# Patient Record
(demographics unavailable — no encounter records)

---

## 2024-11-08 NOTE — PLAN
[FreeTextEntry1] : Clinical breast exam performed and self present explained along with indications for early screening.  STI screening performed along with the cervical Pap smear.  No obvious signs of infection on examination.  Importance of maintaining yearly well reports and cervical Pap smears reviewed.  She has a primary care physician and should continue care as directed.  History of premenstrual dysphoria syndrome and was started on Zoloft.  She is overall doing well but feels that she needs to increase her medication.  Patient directed to increase her medication to 75 mg today and a new Rx was sent to pharmacy with direction.  She may return to office in 1 years time, or as needed.  All questions addressed

## 2024-11-08 NOTE — PHYSICAL EXAM
[Appropriately responsive] : appropriately responsive [Alert] : alert [No Acute Distress] : no acute distress [Soft] : soft [Non-tender] : non-tender [Non-distended] : non-distended [No HSM] : No HSM [No Lesions] : no lesions [No Mass] : no mass [Oriented x3] : oriented x3 [Examination Of The Breasts] : a normal appearance [No Masses] : no breast masses were palpable [Labia Majora] : normal [Labia Minora] : normal [Normal] : normal [FreeTextEntry5] : Cervical Pap smear performed

## 2024-11-08 NOTE — HISTORY OF PRESENT ILLNESS
[FreeTextEntry1] : 29-year-old female  presenting to office for her annual well appointment.  Patient is currently on Zoloft 50 mg and is doing much better, but may wish to increase her medication.  This will be discussed at the end of the appointment.  She remains on the minipill and is happy with the medication.  Obstetric history of 2 prior vaginal deliveries and 2 spontaneous abortions.  Her last pregnancy was complicated by elevated GCT and a history of gestational hypertension.  She is Rh- blood type.  Gynecologic history significant for previous menstrual syndrome.  Premenstrual dysphoric disorder she is currently taking Zoloft.  Denies alcohol, tobacco, illicit drug use.  Denies allergies to medications.